# Patient Record
Sex: FEMALE | Race: WHITE | NOT HISPANIC OR LATINO | URBAN - METROPOLITAN AREA
[De-identification: names, ages, dates, MRNs, and addresses within clinical notes are randomized per-mention and may not be internally consistent; named-entity substitution may affect disease eponyms.]

---

## 2017-08-29 ENCOUNTER — OFFICE (OUTPATIENT)
Dept: URBAN - METROPOLITAN AREA CLINIC 78 | Facility: CLINIC | Age: 72
End: 2017-08-29
Payer: COMMERCIAL

## 2017-08-29 VITALS
HEIGHT: 60 IN | SYSTOLIC BLOOD PRESSURE: 118 MMHG | WEIGHT: 129 LBS | TEMPERATURE: 98.3 F | HEART RATE: 98 BPM | DIASTOLIC BLOOD PRESSURE: 75 MMHG

## 2017-08-29 DIAGNOSIS — R11.0 NAUSEA: ICD-10-CM

## 2017-08-29 DIAGNOSIS — R10.12 LEFT UPPER QUADRANT PAIN: ICD-10-CM

## 2017-08-29 PROCEDURE — 99213 OFFICE O/P EST LOW 20 MIN: CPT

## 2017-08-29 NOTE — SERVICEHPINOTES
She continues to have abdominal pain when bending over in the left upper chest. It feels like something is "catching" and this takes her breath away--only happens when bending. She takes a PPI to help with her nausea (which does help prevent vomiting and lessens the nausea). She denies heartburn. She had an EGD in 2013 which showed h pylori-was tx with a prevpac but don't see where she has been checked for eradication. She notes fecal urgency at times--when this occurs this is diarrhea. She has had these problems for years and has followed with Dr. Martínez for this. No regular NSAID use. No changes in appetite and no new onset early satiety.

## 2017-10-10 ENCOUNTER — AMBULATORY SURGICAL CENTER (OUTPATIENT)
Dept: URBAN - METROPOLITAN AREA SURGERY 21 | Facility: SURGERY | Age: 72
End: 2017-10-10
Payer: COMMERCIAL

## 2017-10-10 DIAGNOSIS — R11.0 NAUSEA: ICD-10-CM

## 2017-10-10 DIAGNOSIS — R10.13 EPIGASTRIC PAIN: ICD-10-CM

## 2017-10-10 PROCEDURE — 43239 EGD BIOPSY SINGLE/MULTIPLE: CPT

## 2017-12-06 ENCOUNTER — OFFICE (OUTPATIENT)
Dept: URBAN - METROPOLITAN AREA CLINIC 78 | Facility: CLINIC | Age: 72
End: 2017-12-06
Payer: COMMERCIAL

## 2017-12-06 VITALS
WEIGHT: 131 LBS | SYSTOLIC BLOOD PRESSURE: 118 MMHG | TEMPERATURE: 97.1 F | DIASTOLIC BLOOD PRESSURE: 82 MMHG | HEART RATE: 88 BPM | HEIGHT: 60 IN

## 2017-12-06 DIAGNOSIS — R68.81 EARLY SATIETY: ICD-10-CM

## 2017-12-06 DIAGNOSIS — K29.00 ACUTE GASTRITIS WITHOUT BLEEDING: ICD-10-CM

## 2017-12-06 DIAGNOSIS — R10.12 LEFT UPPER QUADRANT PAIN: ICD-10-CM

## 2017-12-06 PROCEDURE — 99213 OFFICE O/P EST LOW 20 MIN: CPT

## 2017-12-06 NOTE — SERVICEHPINOTES
She continues to have abdominal pain when bending over in the left upper chest. It feels like something is "catching" and this takes her breath away--only happens when bending. We obtained an UGI which was unremarkable. We then updated an EGD which showed mild gastritis only--patient started on BID Pi which hasn't helped. We also obtained a CT scan with contrast which was unremarkable. She saw her PCP who thinks she may have costochondritis. She has not tried antispasmodics. She is treated for glaucoma. At times, she notes early satiety but this is not consistent. No other GI related complaints today.

## 2021-04-12 ENCOUNTER — OFFICE (OUTPATIENT)
Dept: URBAN - METROPOLITAN AREA CLINIC 79 | Facility: CLINIC | Age: 76
End: 2021-04-12
Payer: COMMERCIAL

## 2021-04-12 VITALS
WEIGHT: 120 LBS | SYSTOLIC BLOOD PRESSURE: 146 MMHG | DIASTOLIC BLOOD PRESSURE: 72 MMHG | HEART RATE: 98 BPM | TEMPERATURE: 96.9 F | HEIGHT: 60 IN

## 2021-04-12 DIAGNOSIS — K21.0 GASTRO-ESOPHAGEAL REFLUX DISEASE WITH ESOPHAGITIS: ICD-10-CM

## 2021-04-12 DIAGNOSIS — R19.7 DIARRHEA, UNSPECIFIED: ICD-10-CM

## 2021-04-12 DIAGNOSIS — R15.9 FULL INCONTINENCE OF FECES: ICD-10-CM

## 2021-04-12 PROCEDURE — 99204 OFFICE O/P NEW MOD 45 MIN: CPT | Performed by: PHYSICIAN ASSISTANT

## 2021-04-12 NOTE — SERVICEHPINOTES
She fractured her pelvis 7 yrs ago (fell in her garage). After this, she developed fecal incontinence. She can cough and pass gas or stool. Generally, will pass small amounts of loose stool. Doesn't always make it to the bathroom on time. Lately, all of her stools have been loose. No blood in her stool. Loose stools have been present for yrs and predates her last colonoscopy which was in 2013 and unremarkable.   She spoke to her pulmonologist who send her here to discuss belching. She has intermittent belching. She will spit up mucous or liquid. She has COPD and asthma. Her pulmonologist thinks that she may have reflux as well. The head of her bed is elevated. She can feel something "moving" in the epigastrium and has to stretch the area out--feels like spasms.  At one point, she took Pantoprazole and this helped but no longer on it. The other night, she had one slice of pizza and she got nauseated. EGD 10/2017 showed mild gastritis but was otherwise unremarkableNo early satiety, dysphagia, or abdominal pain.

## 2021-04-14 LAB
C DIFFICILE TOXIN GENE NAA: POSITIVE
OVA + PARASITE EXAM: (no result)
PANCREATIC ELASTASE, FECAL: 477 UG ELAST./G (ref 200–?)
RESULT: RESULT 1: (no result)
STOOL CULTURE: CAMPYLOBACTER CULTURE: (no result)
STOOL CULTURE: E COLI SHIGA TOXIN EIA: NEGATIVE
STOOL CULTURE: SALMONELLA/SHIGELLA SCREEN: (no result)

## 2021-05-24 ENCOUNTER — OFFICE (OUTPATIENT)
Dept: URBAN - METROPOLITAN AREA CLINIC 79 | Facility: CLINIC | Age: 76
End: 2021-05-24
Payer: COMMERCIAL

## 2021-05-24 VITALS
DIASTOLIC BLOOD PRESSURE: 80 MMHG | HEART RATE: 81 BPM | WEIGHT: 120 LBS | SYSTOLIC BLOOD PRESSURE: 130 MMHG | HEIGHT: 60 IN | TEMPERATURE: 96.5 F

## 2021-05-24 DIAGNOSIS — A04.71 ENTEROCOLITIS DUE TO CLOSTRIDIUM DIFFICILE, RECURRENT: ICD-10-CM

## 2021-05-24 DIAGNOSIS — R15.9 FULL INCONTINENCE OF FECES: ICD-10-CM

## 2021-05-24 DIAGNOSIS — R19.7 DIARRHEA, UNSPECIFIED: ICD-10-CM

## 2021-05-24 PROCEDURE — 99213 OFFICE O/P EST LOW 20 MIN: CPT | Performed by: PHYSICIAN ASSISTANT

## 2021-05-24 NOTE — SERVICEHPINOTES
Ms. Henderson is here for f/u regarding diarrhea. I saw her last month for this and she was dx with C Diff. She was then tx with Vancomycin x 10 days. No further diarrhea--stools are a type 4 on the BSS. No blood in the stool. Prior to being dx with C Diff had not been on any abx. She started PT for fecal incontinence so far it has been helpful. She wants to continue the exercises at home and not return in person d/t anxiety over driving to [facility]. No other GI related complaints today.

## 2022-08-04 ENCOUNTER — OFFICE (OUTPATIENT)
Dept: URBAN - METROPOLITAN AREA CLINIC 79 | Facility: CLINIC | Age: 77
End: 2022-08-04
Payer: COMMERCIAL

## 2022-08-04 VITALS
HEIGHT: 60 IN | HEART RATE: 102 BPM | TEMPERATURE: 97.9 F | DIASTOLIC BLOOD PRESSURE: 81 MMHG | WEIGHT: 124 LBS | SYSTOLIC BLOOD PRESSURE: 155 MMHG

## 2022-08-04 DIAGNOSIS — R19.01 RIGHT UPPER QUADRANT ABDOMINAL SWELLING, MASS AND LUMP: ICD-10-CM

## 2022-08-04 DIAGNOSIS — R12 HEARTBURN: ICD-10-CM

## 2022-08-04 PROCEDURE — 99214 OFFICE O/P EST MOD 30 MIN: CPT | Performed by: PHYSICIAN ASSISTANT

## 2022-08-04 NOTE — SERVICEHPINOTES
Pt is here to discuss a "knot" present in the RUQ. She notes that this is present mostly when twisting to the side. She will note it more too when bending to dry herself when she gets out of the shower. Symptoms present for about one year. She states that this is a palpable knot that she can feel in the RUQ. No triggers to this, it just "comes on". She mentions heartburn which is present worse at night. She has tried a probiotic and Lansoprazole OTC which haven't help. She goes long periods of time without eating. She doesn't eat 30-45 mins after taking the PPI. She eats a GERD provoking diet. The head of her bed is elevated. No weight loss, anorexia, early satiety, N/V, melena, or abdominal pain. EGD in 2017 showed mild gastritis. No imaging of the ab/pelvis present.  
br
brLast colonoscopy 03/2013 showed a tortuous colon future colonoscopies "maybe difficult d/t intra-abdominal adhesions/ tortuous colon". Given a 10 yr recall. No other GI related complaints today.

## 2022-09-23 ENCOUNTER — OFFICE (OUTPATIENT)
Dept: URBAN - METROPOLITAN AREA CLINIC 79 | Facility: CLINIC | Age: 77
End: 2022-09-23
Payer: COMMERCIAL

## 2022-09-23 VITALS
HEIGHT: 60 IN | HEART RATE: 86 BPM | TEMPERATURE: 97.5 F | WEIGHT: 124 LBS | DIASTOLIC BLOOD PRESSURE: 75 MMHG | SYSTOLIC BLOOD PRESSURE: 163 MMHG

## 2022-09-23 DIAGNOSIS — R14.2 ERUCTATION: ICD-10-CM

## 2022-09-23 DIAGNOSIS — R13.10 DYSPHAGIA, UNSPECIFIED: ICD-10-CM

## 2022-09-23 DIAGNOSIS — R68.81 EARLY SATIETY: ICD-10-CM

## 2022-09-23 PROCEDURE — 99213 OFFICE O/P EST LOW 20 MIN: CPT | Performed by: PHYSICIAN ASSISTANT

## 2022-09-23 NOTE — SERVICEHPINOTES
Pt is here for f/u to feeling a "knot" in the RUQ. She would feel this more w/bending and twisting. Lately has felt it when she reclined in her recliner. We obtained a CT scan which showed no reason for this but did show a 4 cm seroma and vascular changes such as plaque in the aorta. She tells me she had previous vascular surgery for PVD. She is no longer having any belching. We started her on Lansoprazole and Famotidine QHS. She does go all day w/o eating. She notes early satiety which is new in onset. She also notes dysphagia--this occurs with certain solid foods like popcorn, salad, chicken. She tells me the above symptoms but also states she hasn't paid attention to her symptoms that much. No weight loss, N/V, abdominal pain. She doesn't want to have an EGD at this point. No known heart issues. She takes plavix for a hx of leg stents. No other GI related complaints today. Additional Notes: Pt. requested topical treatment because he has tried something in the past that helped that was topical and he feels these spots are cosmetically notable. Render Risk Assessment In Note?: no Detail Level: Zone